# Patient Record
(demographics unavailable — no encounter records)

---

## 2024-12-04 NOTE — HISTORY OF PRESENT ILLNESS
[No] : Patient does not have concerns regarding sex [Never active] : never active [FreeTextEntry1] : 20 yo presents for follow up evaluation of irregular cycles, dysmenorrhea.  Patient started BCP 3 months ago and reports monthly bleeding w/o pain, abnormal bleeding or side effects.  Patient denies any other abnormal side effects and desires to continue BCP.

## 2024-12-04 NOTE — DISCUSSION/SUMMARY
[FreeTextEntry1] :  PCOS, irregular mentrual cycles- advised to continue BCP and to follow up for any change in menstrual bleeding. I spent the time noted on the day of this patient encounter preparing for, providing and documenting the above service. I have counseled and educated the patient on the differential, workup, disease course, and treatment/management plan. Education was provided to the patient during this encounter. All questions and concerns were answered and addressed in detail.

## 2024-12-04 NOTE — HISTORY OF PRESENT ILLNESS
[No] : Patient does not have concerns regarding sex [Never active] : never active [FreeTextEntry1] : 18 yo presents for follow up evaluation of irregular cycles, dysmenorrhea.  Patient started BCP 3 months ago and reports monthly bleeding w/o pain, abnormal bleeding or side effects.  Patient denies any other abnormal side effects and desires to continue BCP.